# Patient Record
Sex: MALE | Race: WHITE
[De-identification: names, ages, dates, MRNs, and addresses within clinical notes are randomized per-mention and may not be internally consistent; named-entity substitution may affect disease eponyms.]

---

## 2021-04-12 ENCOUNTER — HOSPITAL ENCOUNTER (INPATIENT)
Dept: HOSPITAL 96 - M.ERS | Age: 61
LOS: 2 days | Discharge: HOME | DRG: 65 | End: 2021-04-14
Attending: STUDENT IN AN ORGANIZED HEALTH CARE EDUCATION/TRAINING PROGRAM | Admitting: STUDENT IN AN ORGANIZED HEALTH CARE EDUCATION/TRAINING PROGRAM
Payer: MEDICAID

## 2021-04-12 VITALS — HEIGHT: 69.02 IN | WEIGHT: 193 LBS | BODY MASS INDEX: 28.58 KG/M2

## 2021-04-12 VITALS — DIASTOLIC BLOOD PRESSURE: 106 MMHG | SYSTOLIC BLOOD PRESSURE: 191 MMHG

## 2021-04-12 DIAGNOSIS — Z79.82: ICD-10-CM

## 2021-04-12 DIAGNOSIS — F17.210: ICD-10-CM

## 2021-04-12 DIAGNOSIS — F41.9: ICD-10-CM

## 2021-04-12 DIAGNOSIS — I10: ICD-10-CM

## 2021-04-12 DIAGNOSIS — I63.9: Primary | ICD-10-CM

## 2021-04-12 DIAGNOSIS — Z79.899: ICD-10-CM

## 2021-04-12 DIAGNOSIS — G45.9: ICD-10-CM

## 2021-04-12 DIAGNOSIS — Z20.822: ICD-10-CM

## 2021-04-12 LAB
ABSOLUTE BASOPHILS: 0 THOU/UL (ref 0–0.2)
ABSOLUTE EOSINOPHILS: 0.3 THOU/UL (ref 0–0.7)
ABSOLUTE MONOCYTES: 0.8 THOU/UL (ref 0–1.2)
ALBUMIN SERPL-MCNC: 3.3 G/DL (ref 3.4–5)
ALP SERPL-CCNC: 118 U/L (ref 46–116)
ALT SERPL-CCNC: 23 U/L (ref 30–65)
ANION GAP SERPL CALC-SCNC: 8 MMOL/L (ref 7–16)
AST SERPL-CCNC: 17 U/L (ref 15–37)
BASOPHILS NFR BLD AUTO: 0.4 %
BILIRUB SERPL-MCNC: 0.4 MG/DL
BILIRUB UR-MCNC: NEGATIVE MG/DL
BUN SERPL-MCNC: 18 MG/DL (ref 7–18)
CALCIUM SERPL-MCNC: 8.5 MG/DL (ref 8.5–10.1)
CHLORIDE SERPL-SCNC: 103 MMOL/L (ref 98–107)
CO2 SERPL-SCNC: 28 MMOL/L (ref 21–32)
COLOR UR: YELLOW
CREAT SERPL-MCNC: 1.5 MG/DL (ref 0.6–1.3)
EOSINOPHIL NFR BLD: 2.5 %
GLUCOSE SERPL-MCNC: 137 MG/DL (ref 70–99)
GRANULOCYTES NFR BLD MANUAL: 57.4 %
HCT VFR BLD CALC: 48 % (ref 42–52)
HGB BLD-MCNC: 16.4 GM/DL (ref 14–18)
INR PPP: 1
KETONES UR STRIP-MCNC: NEGATIVE MG/DL
LYMPHOCYTES # BLD: 3.3 THOU/UL (ref 0.8–5.3)
LYMPHOCYTES NFR BLD AUTO: 32.2 %
MAGNESIUM SERPL-MCNC: 1.8 MG/DL (ref 1.8–2.4)
MCH RBC QN AUTO: 31.4 PG (ref 26–34)
MCHC RBC AUTO-ENTMCNC: 34.2 G/DL (ref 28–37)
MCV RBC: 91.8 FL (ref 80–100)
MONOCYTES NFR BLD: 7.5 %
MPV: 8.2 FL. (ref 7.2–11.1)
NEUTROPHILS # BLD: 5.9 THOU/UL (ref 1.6–8.1)
NT-PRO BRAIN NAT PEPTIDE: 113 PG/ML (ref ?–300)
NUCLEATED RBCS: 0 /100WBC
PLATELET COUNT*: 175 THOU/UL (ref 150–400)
POTASSIUM SERPL-SCNC: 3.7 MMOL/L (ref 3.5–5.1)
PROT SERPL-MCNC: 7 G/DL (ref 6.4–8.2)
PROT UR QL STRIP: NEGATIVE
PROTHROMBIN TIME: 10.4 SECONDS (ref 9.2–11.5)
RBC # BLD AUTO: 5.23 MIL/UL (ref 4.5–6)
RBC # UR STRIP: NEGATIVE /UL
RDW-CV: 14.8 % (ref 10.5–14.5)
SODIUM SERPL-SCNC: 139 MMOL/L (ref 136–145)
SP GR UR STRIP: 1.01 (ref 1–1.03)
URINE CLARITY: CLEAR
URINE GLUCOSE-RANDOM: NEGATIVE
URINE LEUKOCYTES-REFLEX: NEGATIVE
URINE NITRITE-REFLEX: NEGATIVE
UROBILINOGEN UR STRIP-ACNC: 0.2 E.U./DL (ref 0.2–1)
WBC # BLD AUTO: 10.2 THOU/UL (ref 4–11)

## 2021-04-12 NOTE — CON
[facility] 
[address] [place]  [address]                    CONSULTATION                  
_______________________________________________________________________________
 
Name:       HERVE POWERS               Room:           [address] [place]  
.R.#:  [id number]      Account #:      [id number]  
Admission:  21     Attend Phys:    ELIANA Dillon
Discharge:               Date of Birth:  60  
         Report #: [id number]
                                                                     [id number]  
_______________________________________________________________________________
THIS REPORT FOR:  
 
cc:  TARA ULLOA,TARA Desir,Harlan ZHAO MD                                              ~
 
 
DATE OF SERVICE:  2021
 
 
HISTORY OF PRESENT ILLNESS:  This is a 61-year-old male patient who was
evaluated by me for an episode of speech difficulty.  I initially saw the
patient and the patient does not even remember that he had any speech difficulty
yesterday.  Subsequently, I talked to Dr. Duarte and later on I was able to get
hold of the patient's daughter and talked to her in detail.  I also talked to
Dr. Li, Emergency Room physician from last night.
 
The daughter provides a history that this patient had multiple strokes in the
past.  That has affected his memory.  He remembers the long term events
reasonably well, but he does not remember short term event and that was verified
by neurological examination subsequently.  The patient is not complaining of any
symptoms this morning.  The daughter indicates that she collected a large fold
from his previous hospitalization in [place] and she will bring for me to
review it tomorrow.
 
REVIEW OF SYSTEMS:  Indicate that this patient was living with his wife.  His
wife  a few years ago.  He had some depression and he is on antidepressants,
but he also started to have these multiple strokes, I do not know where those
strokes were and it basically affected his memory.  He had the workup in the
past.  To the best of the daughter's knowledge, he does not drink alcohol, he
does smoke.  A 14-point review of system with the patient indicated that he
denies any eye, ENT, cardiac, respiratory, GI, , musculoskeletal,
constitutional, dermatological, hematological, psychiatric, throat or allergic
symptom associated with present symptomatology.
 
PAST MEDICAL HISTORY:  Positive for stroke, but I need to find out more about it
and hopefully we will find out tomorrow when daughter brings prior records from
[place].
 
SOCIAL HISTORY:  This patient has a history of smoking.
 
PHYSICAL EXAMINATION:
NEUROLOGIC:  Indicate he is alert.  He could not tell me what month it is.  He
could not tell me the exact date.  He knew what hospital he was in.  He
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
did that.  His cranial nerve examination, the best it could be done with his
 
 
 
[place] [address]                    CONSULTATION                  
_______________________________________________________________________________
 
Name:       HERVE POWERS               Room:           [address] [place]  
[facility].#:  [id number]      Account #:      [id number]  
Admission:  21     Attend Phys:    ELIANA Dillon
Discharge:               Date of Birth:  60  
         Report #: [id number]
                                                                     [id number]  
_______________________________________________________________________________
 
 
limited cooperation looks unremarkable.  His strength, sensation, reflexes and
tone is symmetrical.  He does not appear to have any abnormality of the
finger-to-nose.  There is no meningeal sign.  There is no carotid bruit. 
Cardiorespiratory examination is unremarkable.
VITAL SIGNS:  Blood pressure is 139/103, pulse is 88, and temperature is 99.
EXTREMITIES:  His pulses in the lower extremities is nicely palpable.  He has no
edema, cyanosis or jaundice.
CARDIAC:  Unremarkable.
LUNGS:  No respiratory difficulty was noted.
 
LABORATORY DATA:  Indicate WBC of 10.2.
 
IMPRESSION:  This patient's cognition is impaired.  He may be having multiple
infarcts dementia because CT demonstrated multiple strokes.  He needs an
evaluation by neuropsychologist, but there is no neuropsychologist comes here. 
I will see if speech can do the cognitive evaluation in the meantime.  I would
like to do an MRI to see if he had another stroke.  We give him Plavix last time
and presently we will continue the combination of aspirin and Plavix.  I did
order TSH, vitamin B12 and a sed rate.  He does have carotid stenosis and we
will see if we can find an infarct in the distribution of the carotid, which
will make that symptomatic and then we may have to consult the Vascular Surgery.
 More than 50 minutes of time was spent taking care of this patient today and
majority was spent counseling and coordinating by talking to multiple other
health care professionals as summarized above.
 
Thank you very much for this referral.
 
 
 
 
 
 
 
 
 
 
 
 
 
 
 
 
                       
                                        By:                                
                 
D: 21 1333_______________________________________
T: 21 2137Harlan Desir MD            /nt

## 2021-04-13 VITALS — SYSTOLIC BLOOD PRESSURE: 197 MMHG | DIASTOLIC BLOOD PRESSURE: 111 MMHG

## 2021-04-13 VITALS — SYSTOLIC BLOOD PRESSURE: 181 MMHG | DIASTOLIC BLOOD PRESSURE: 105 MMHG

## 2021-04-13 VITALS — SYSTOLIC BLOOD PRESSURE: 118 MMHG | DIASTOLIC BLOOD PRESSURE: 51 MMHG

## 2021-04-13 VITALS — SYSTOLIC BLOOD PRESSURE: 175 MMHG | DIASTOLIC BLOOD PRESSURE: 97 MMHG

## 2021-04-13 VITALS — DIASTOLIC BLOOD PRESSURE: 103 MMHG | SYSTOLIC BLOOD PRESSURE: 139 MMHG

## 2021-04-13 VITALS — DIASTOLIC BLOOD PRESSURE: 76 MMHG | SYSTOLIC BLOOD PRESSURE: 153 MMHG

## 2021-04-13 LAB
ALBUMIN SERPL-MCNC: 3.4 G/DL (ref 3.4–5)
ALP SERPL-CCNC: 136 U/L (ref 46–116)
ALT SERPL-CCNC: 23 U/L (ref 30–65)
ANION GAP SERPL CALC-SCNC: 10 MMOL/L (ref 7–16)
AST SERPL-CCNC: 16 U/L (ref 15–37)
BILIRUB SERPL-MCNC: 0.7 MG/DL
BUN SERPL-MCNC: 11 MG/DL (ref 7–18)
CALCIUM SERPL-MCNC: 9.5 MG/DL (ref 8.5–10.1)
CHLORIDE SERPL-SCNC: 104 MMOL/L (ref 98–107)
CO2 SERPL-SCNC: 25 MMOL/L (ref 21–32)
CREAT SERPL-MCNC: 1.3 MG/DL (ref 0.6–1.3)
EST. AVERAGE GLUCOSE BLD GHB EST-MCNC: 114 MG/DL
GLUCOSE SERPL-MCNC: 99 MG/DL (ref 70–99)
GLYCOHEMOGLOBIN (HGB A1C): 5.6 % (ref 4.8–5.6)
POTASSIUM SERPL-SCNC: 4 MMOL/L (ref 3.5–5.1)
PROT SERPL-MCNC: 7.3 G/DL (ref 6.4–8.2)
SODIUM SERPL-SCNC: 139 MMOL/L (ref 136–145)

## 2021-04-13 NOTE — EKG
[place] [address]
Phone:  [phone]                     ELECTROCARDIOGRAM REPORT      
_______________________________________________________________________________
 
Name:         HERVE POWERS              Room:          [address]    [place]M [place] 
..#:    [id number]     Account #:     [id number]  
Admission:    21    Attend Phys:   Kalli Servin
Discharge:                Date of Birth: [birth year]  
Date of Service: 21  Report #:      [id number]
        [id number]
_______________________________________________________________________________
THIS REPORT FOR:  //name//                      
 
                         [facility] ED
                                       
Test Date:    2021               Test Time:    20:05:17
Pat Name:     HERVE POWERS           Department:   
Patient ID:   [id number]            Room:         [facility]
Gender:       M                        Technician:   IVÁN
:          1960               Requested By: Angelica Li
Order Number: [id number] MD:   Spencer Rdz
                                 Measurements
Intervals                              Axis          
Rate:         71                       P:            26
TN:           177                      QRS:          -80
QRSD:         90                       T:            76
QT:           388                                    
QTc:          422                                    
                           Interpretive Statements
Sinus rhythm
left axis
Abnormal R-wave progression, late transition
Inferior infarct, old
Baseline wander in lead(s) I,II,aVR,V3
No previous ECG available for comparison
Electronically Signed On 2021 9:38:50 CDT by Spencer Rdz
[email or web address]
 
 
 
 
 
 
 
 
 
 
 
 
 
 
 
 
 
 
  <ELECTRONICALLY SIGNED>
                                           By: Spencer Rdz MD, [facility]      
  21     0938
D: 21   _____________________________________
T: 21   Spencer Rdz MD, [facility]        /EPI

## 2021-04-14 VITALS — DIASTOLIC BLOOD PRESSURE: 93 MMHG | SYSTOLIC BLOOD PRESSURE: 174 MMHG

## 2021-04-14 VITALS — SYSTOLIC BLOOD PRESSURE: 174 MMHG | DIASTOLIC BLOOD PRESSURE: 93 MMHG

## 2021-04-14 VITALS — SYSTOLIC BLOOD PRESSURE: 152 MMHG | DIASTOLIC BLOOD PRESSURE: 79 MMHG

## 2021-04-14 VITALS — SYSTOLIC BLOOD PRESSURE: 131 MMHG | DIASTOLIC BLOOD PRESSURE: 80 MMHG

## 2021-04-14 LAB
CHOLEST SERPL-MCNC: 146 MG/DL (ref ?–200)
HDLC SERPL-MCNC: 46 MG/DL (ref 40–?)
LDLC SERPL-MCNC: 78 MG/DL (ref ?–100)
TC:HDL: 3.2 RATIO
TRIGL SERPL-MCNC: 114 MG/DL (ref ?–150)
VLDLC SERPL CALC-MCNC: 23 MG/DL (ref ?–40)

## 2021-04-14 NOTE — 2DMMODE
[place] [address]
Phone:  [phone] 2 D/M-MODE ECHOCARDIOGRAM     
_______________________________________________________________________________
 
Name:         HERVE POWERS              Room:          [address] [place] 
.JERAMY.#:    [id number]     Account #:     [id number]  
Admission:    21    Attend Phys:   Kalli Servin
Discharge:    21    Date of Birth: [birth year]  
Date of Service: 21 1441  Report #:      [id number]
        [id number]
_______________________________________________________________________________
THIS REPORT FOR:
 
cc:  TARA ULLOA,TARA Rdz,Spencer SCOTT MD [facility]        
                                                                       ~
 
--------------- APPROVED REPORT --------------
 
 
Study performed:  2021 12:06:03
 
EXAM: Comprehensive 2D, Doppler, and color-flow 
Echocardiogram 
Patient Location: In-Patient   
Room #:  [facility]     Status:  routine
 
      BSA:         2.01
HR: 65 bpm BP:          174/93 mmHg 
Rhythm: NSR     
 
Other Information 
Study Quality: Good
 
Indications
CVA/TIA 
 
Echo Enhancing Agent
Indication: Rule out Shunt
Agent(s) / Amount(s) Used: Agitated Saline 10 cc
 
2D Dimensions
IVSd:  12.33 (7-11mm) LVOT Diam:  19.51 (18-24mm) 
LVDd:  38.86 mm  
PWd:  12.56 (7-11mm) Ascending Ao:  33.77 (22-36mm)
LVDs:  22.26 (25-40mm) 
Aortic Root:  33.93 mm 
 
Aortic Valve
AoV Peak Kelvin.:  1.20 m/s 
AO Peak Gr.:  5.74 mmHg  LVOT Max P.98 mmHg
AO Mean Gr.:  3.16 mmHg  LVOT Mean P.55 mmHg
    LVOT Max V:  1.22 m/s
AO V2 VTI:  26.33 cm  LVOT Mean V:  0.72 m/s
BRIDGER (VTI):  3.00 cm2  LVOT V1 VTI:  26.44 cm
 
 
 
[place] [address]
Phone:  [phone]                     2 D/M-MODE ECHOCARDIOGRAM     
_______________________________________________________________________________
 
Name:         HERVE POWERS              Room:          [address] [place] 
M.R.#:    [id number]     Account #:     [id number]  
Admission:    21    Attend Phys:   Kalli Servin
Discharge:    21    Date of Birth: [birth year]  
Date of Service: 21 1441  Report #:      [id number]
        [id number]
_______________________________________________________________________________
Mitral Valve
    E/A Ratio:  0.63
    MV Decel. Time:  360.52 ms
MV E Max Kelvin.:  0.46 m/s 
MV PHT:  104.55 ms  
MVA (PHT):  2.10 cm2  
 
TDI
 Medial E' Kelvin.:  0.08 m/s
 Lateral E' Kelvin.:  0.11 m/s
 
Pulmonary Valve
PV Peak Kelvin.:  1.09 m/s PV Peak Gr.:  4.73 mmHg
 
Left Ventricle
The left ventricle is normal size. There is normal LV segmental wall 
motion. Mild concentric left ventricular hypertrophy. Left 
ventricular systolic function is normal. The left ventricular 
ejection fraction is within the normal range. LVEF is 60-65%. Grade I 
- abnormal relaxation pattern.
 
Right Ventricle
The right ventricle is normal size. The right ventricular systolic 
function is normal.
 
Atria
The left atrium size is normal. Interatrial septum is intact without 
evidence of ASD or PFO. The right atrium size is normal.
 
Aortic Valve
The Aortic valve is sclerotic. No aortic regurgitation is present. 
There is no aortic valvular stenosis.
 
Mitral Valve
The mitral valve is normal in structure. There is no mitral valve 
regurgitation noted. No evidence of mitral valve stenosis.
 
Tricuspid Valve
The tricuspid valve is normal in structure. Unable to assess PA 
pressure. Trace tricuspid regurgitation.
 
Pulmonic Valve
Pulmonic valve is not well visualized. There is no pulmonic valvular 
regurgitation.
 
[facility]
 
 
[place] [address]
Phone:  [phone]                     2 D/M-MODE ECHOCARDIOGRAM     
_______________________________________________________________________________
 
Name:         HERVE POWERS              Room:          [address]#:    [id number]     Account #:     [id number]  
Admission:    21    Attend Phys:   Kalli Servin
Discharge:    21    Date of Birth: [birth year]  
Date of Service: 21 1441  Report #:      [id number]
        [id number]
_______________________________________________________________________________
The aortic root is normal in size. IVC is normal in size and 
collapses >50% with inspiration.
 
Pericardium
There is no pericardial effusion.
 
<Conclusion>
LVEF is 60-65%.
Mild concentric left ventricular hypertrophy.
The Aortic valve is sclerotic.
Interatrial septum is intact without evidence of ASD or PFO.
 
 
 
 
 
 
 
 
 
 
 
 
 
 
 
 
 
 
 
 
 
 
 
 
 
 
 
 
 
 
 
 
 
  <ELECTRONICALLY SIGNED>
                                           By: Spencer Rdz MD, [facility]      
  21     1441
D: 21 144   _____________________________________
T: 21 144   Spencer Rdz MD, FACC        /INF